# Patient Record
Sex: MALE | Race: WHITE | NOT HISPANIC OR LATINO | URBAN - METROPOLITAN AREA
[De-identification: names, ages, dates, MRNs, and addresses within clinical notes are randomized per-mention and may not be internally consistent; named-entity substitution may affect disease eponyms.]

---

## 2022-11-15 ENCOUNTER — APPOINTMENT (RX ONLY)
Dept: URBAN - METROPOLITAN AREA CLINIC 56 | Facility: CLINIC | Age: 72
Setting detail: DERMATOLOGY
End: 2022-11-15

## 2022-11-15 DIAGNOSIS — L70.8 OTHER ACNE: ICD-10-CM

## 2022-11-15 PROCEDURE — ? ADDITIONAL NOTES

## 2022-11-15 PROCEDURE — ? FULL BODY SKIN EXAM - DECLINED

## 2022-11-15 PROCEDURE — ? ACNE SURGERY

## 2022-11-15 PROCEDURE — 10040 EXTRACTION: CPT

## 2022-11-15 ASSESSMENT — LOCATION ZONE DERM: LOCATION ZONE: NOSE

## 2022-11-15 ASSESSMENT — LOCATION DETAILED DESCRIPTION DERM: LOCATION DETAILED: NASAL DORSUM

## 2022-11-15 ASSESSMENT — LOCATION SIMPLE DESCRIPTION DERM: LOCATION SIMPLE: NOSE

## 2022-11-15 NOTE — PROCEDURE: ACNE SURGERY
Post-Care Instructions: I reviewed with the patient in detail post-care instructions. Patient is to keep the treatment areas cleansed with soap and water and vaseline application until healed.
Extraction Method: 11 blade and q-tip
Prep Text (Optional): Prior to removal the treatment areas were prepped in the usual fashion.
Acne Type: Cysts
Render Post-Care Instructions In Note?: no
Detail Level: Simple
Consent was obtained and risks were reviewed including but not limited to scarring, infection, bleeding, scabbing, incomplete removal, and allergy to anesthesia.